# Patient Record
Sex: FEMALE | Race: WHITE | URBAN - METROPOLITAN AREA
[De-identification: names, ages, dates, MRNs, and addresses within clinical notes are randomized per-mention and may not be internally consistent; named-entity substitution may affect disease eponyms.]

---

## 2017-02-16 ENCOUNTER — OFFICE VISIT (OUTPATIENT)
Dept: FAMILY MEDICINE | Facility: CLINIC | Age: 47
End: 2017-02-16
Payer: COMMERCIAL

## 2017-02-16 VITALS
HEART RATE: 100 BPM | BODY MASS INDEX: 31.92 KG/M2 | SYSTOLIC BLOOD PRESSURE: 130 MMHG | DIASTOLIC BLOOD PRESSURE: 80 MMHG | WEIGHT: 200.8 LBS | TEMPERATURE: 97.9 F

## 2017-02-16 DIAGNOSIS — M53.3 SI (SACROILIAC) JOINT DYSFUNCTION: Primary | ICD-10-CM

## 2017-02-16 PROCEDURE — 20610 DRAIN/INJ JOINT/BURSA W/O US: CPT | Mod: LT | Performed by: PHYSICIAN ASSISTANT

## 2017-02-16 RX ORDER — DULOXETIN HYDROCHLORIDE 30 MG/1
30 CAPSULE, DELAYED RELEASE ORAL
COMMUNITY
Start: 2017-02-14

## 2017-02-16 RX ORDER — TRIAMCINOLONE ACETONIDE 40 MG/ML
40 INJECTION, SUSPENSION INTRA-ARTICULAR; INTRAMUSCULAR ONCE
Qty: 1 ML | Refills: 0 | COMMUNITY
Start: 2017-02-16

## 2017-02-16 ASSESSMENT — ENCOUNTER SYMPTOMS
FEVER: 0
EYE REDNESS: 0
DYSURIA: 0
WEIGHT LOSS: 0
NEUROLOGICAL NEGATIVE: 1
ORTHOPNEA: 0
SPUTUM PRODUCTION: 0
HEMOPTYSIS: 0
FREQUENCY: 0
HALLUCINATIONS: 0
ABDOMINAL PAIN: 0
SHORTNESS OF BREATH: 0
NAUSEA: 0
SORE THROAT: 0
LOSS OF CONSCIOUSNESS: 0
FOCAL WEAKNESS: 0
COUGH: 0
EYE DISCHARGE: 0
PALPITATIONS: 0
SENSORY CHANGE: 0
SEIZURES: 0
EYE PAIN: 0
HEARTBURN: 0
NECK PAIN: 0
DIAPHORESIS: 0
HEADACHES: 0
MYALGIAS: 1
DIZZINESS: 0
BLOOD IN STOOL: 0
DEPRESSION: 0
BLURRED VISION: 0
INSOMNIA: 0
WEAKNESS: 0
DIARRHEA: 0
CONSTIPATION: 0
PHOTOPHOBIA: 0
TINGLING: 0
WHEEZING: 0
NERVOUS/ANXIOUS: 0
DOUBLE VISION: 0
BACK PAIN: 1
VOMITING: 0

## 2017-02-16 ASSESSMENT — LIFESTYLE VARIABLES: SUBSTANCE_ABUSE: 0

## 2017-02-16 ASSESSMENT — PAIN SCALES - GENERAL: PAINLEVEL: SEVERE PAIN (6)

## 2017-02-16 NOTE — PROGRESS NOTES
HPI    SUBJECTIVE:                                                    Enriqueta Wilson is a 46 year old female who presents to clinic today for follow-up of her back pain/SI joint dysfunction. She is here to have another injection in her left SI joint. She has done well with injections in the past and has had no problems. She denies any numbness or tingling associated with this.      Back injections       Duration: Years     Description (location/character/radiation): Lower back both sides. Patient states that she is wanting one injection at the left SI joint     Intensity:  moderate    Accompanying signs and symptoms: none    History (similar episodes/previous evaluation): None    Precipitating or alleviating factors: None    Therapies tried and outcome: Cortisone     Problem list and histories reviewed & adjusted, as indicated.  Additional history: as documented    There is no problem list on file for this patient.    History reviewed. No pertinent past surgical history.    Social History   Substance Use Topics     Smoking status: Never Smoker     Smokeless tobacco: Not on file     Alcohol use Not on file     History reviewed. No pertinent family history.      Current Outpatient Prescriptions   Medication Sig Dispense Refill     DULoxetine (CYMBALTA) 30 MG EC capsule Take 30 mg by mouth       levothyroxine (SYNTHROID, LEVOTHROID) 75 MCG tablet Take 75 mcg by mouth       hydrochlorothiazide (HYDRODIURIL) 25 MG tablet Take 25 mg by mouth       cyclobenzaprine (FLEXERIL) 10 MG tablet Take 10 mg by mouth       cholecalciferol 2000 UNITS tablet Take 4,000 Units by mouth       montelukast (SINGULAIR) 10 MG tablet Take 10 mg by mouth       guaiFENesin (MUCINEX) 600 MG 12 hr tablet        Multiple Vitamin (MULTI-VITAMINS) TABS Take 1 tablet by mouth       HM OMEGA-3-6-9 FATTY ACIDS CAPS        Potassium Chloride ER 20 MEQ TBCR Take 20 mEq by mouth       cetirizine (ZYRTEC) 10 MG tablet        Allergies   Allergen  Reactions     Codeine Other (See Comments)     Comment: Unknown, Description:      Omeprazole Hives     Pregabalin Other (See Comments)     Amoxicillin Rash     Azithromycin Rash     Comm     Penicillins Rash     Problem list, Medication list, Allergies, and Medical/Social/Surgical histories reviewed in [facility] and updated as appropriate.          Review of Systems   Constitutional: Negative for diaphoresis, fever, malaise/fatigue and weight loss.   HENT: Negative for congestion, ear discharge, ear pain, hearing loss, nosebleeds and sore throat.    Eyes: Negative for blurred vision, double vision, photophobia, pain, discharge and redness.   Respiratory: Negative for cough, hemoptysis, sputum production, shortness of breath and wheezing.    Cardiovascular: Negative for chest pain, palpitations, orthopnea and leg swelling.   Gastrointestinal: Negative for abdominal pain, blood in stool, constipation, diarrhea, heartburn, melena, nausea and vomiting.   Genitourinary: Negative.  Negative for dysuria, frequency and urgency.   Musculoskeletal: Positive for back pain and myalgias. Negative for joint pain and neck pain.   Skin: Negative for itching and rash.   Neurological: Negative.  Negative for dizziness, tingling, sensory change, focal weakness, seizures, loss of consciousness, weakness and headaches.   Endo/Heme/Allergies: Negative.    Psychiatric/Behavioral: Negative for depression, hallucinations, substance abuse and suicidal ideas. The patient is not nervous/anxious and does not have insomnia.          Physical Exam   Constitutional: She is oriented to person, place, and time and well-developed, well-nourished, and in no distress. No distress.   HENT:   Head: Normocephalic and atraumatic.   Right Ear: External ear normal.   Left Ear: External ear normal.   Nose: Nose normal.   Eyes: Conjunctivae and EOM are normal. Pupils are equal, round, and reactive to light. Right eye exhibits no discharge. Left eye exhibits no  discharge. No scleral icterus.   Neck: Normal range of motion. Neck supple. No JVD present. No tracheal deviation present. No thyromegaly present.   Cardiovascular: Normal rate, regular rhythm, normal heart sounds and intact distal pulses.  Exam reveals no gallop and no friction rub.    No murmur heard.  Pulmonary/Chest: Effort normal and breath sounds normal. No stridor. No respiratory distress. She has no wheezes. She has no rales. She exhibits no tenderness.   Abdominal: Soft. Bowel sounds are normal. She exhibits no distension and no mass. There is no tenderness. There is no rebound and no guarding.   Musculoskeletal: She exhibits no edema.        Lumbar back: She exhibits decreased range of motion, tenderness, pain and spasm. Edema:  but I would.        Back:    Lymphadenopathy:     She has no cervical adenopathy.   Neurological: She is alert and oriented to person, place, and time. She has normal reflexes. No cranial nerve deficit. She exhibits normal muscle tone. Gait normal.   Skin: Skin is warm and dry. No rash noted. She is not diaphoretic. No erythema. No pallor.   Psychiatric: Mood, memory, affect and judgment normal.         (M53.3) SI (sacroiliac) joint dysfunction  (primary encounter diagnosis)  Comment:   Plan:    Procedure note: After prep with rubbing alcohol using 80 mg of triamcinolone combined with 7 cc of lidocaine the left SI joint was injected. This offered return immediately if she'll follow-up on a when necessary basis.

## 2017-02-16 NOTE — MR AVS SNAPSHOT
"              After Visit Summary   2017    Enriqueta Wilson    MRN: [id number]           Patient Information     Date Of Birth          1970        Visit Information        Provider Department      2017 2:00 PM Vincent Kingsley PA-C [facility]        Today's Diagnoses     SI (sacroiliac) joint dysfunction    -  1       Follow-ups after your visit        Follow-up notes from your care team     Return if symptoms worsen or fail to improve.      Who to contact     If you have questions or need follow up information about today's clinic visit or your schedule please contact [facility] directly at [phone].  Normal or non-critical lab and imaging results will be communicated to you by MyChart, letter or phone within 4 business days after the clinic has received the results. If you do not hear from us within 7 days, please contact the clinic through [employer] or phone. If you have a critical or abnormal lab result, we will notify you by phone as soon as possible.  Submit refill requests through [employer] or call your pharmacy and they will forward the refill request to us. Please allow 3 business days for your refill to be completed.          Additional Information About Your Visit        MyChart Information     [employer] lets you send messages to your doctor, view your test results, renew your prescriptions, schedule appointments and more. To sign up, go to [email or web address] . Click on \"Log in\" on the left side of the screen, which will take you to the Welcome page. Then click on \"Sign up Now\" on the right side of the page.     You will be asked to enter the access code listed below, as well as some personal information. Please follow the directions to create your username and password.     Your access code is: [email or web address]  Expires: 2017  2:34 PM     Your access code will  in 90 days. If you need help or a new code, please call your [facility] or " [phone].        Care EveryWhere ID     This is your Care EveryWhere ID. This could be used by other organizations to access your [facility] medical records  [id number]        Your Vitals Were     Pulse Temperature BMI (Body Mass Index)             100 97.9  F (36.6  C) (Tympanic) 31.92 kg/m2          Blood Pressure from Last 3 Encounters:   02/16/17 130/80   08/01/16 118/78   03/01/16 (!) 123/93    Weight from Last 3 Encounters:   02/16/17 200 lb 12.8 oz (91.1 kg)   08/01/16 190 lb 6.4 oz (86.4 kg)   03/01/16 192 lb (87.1 kg)              We Performed the Following     Drain/Inject Large Joint/Bursa        Primary Care Provider Office Phone # Fax #    Vincent Kingsley PA-C [phone]       [place] [phone]        Thank you!     Thank you for choosing [facility]  for your care. Our goal is always to provide you with excellent care. Hearing back from our patients is one way we can continue to improve our services. Please take a few minutes to complete the written survey that you may receive in the mail after your visit with us. Thank you!             Your Updated Medication List - Protect others around you: Learn how to safely use, store and throw away your medicines at www.disposemymeds.org.          This list is accurate as of: 2/16/17  2:34 PM.  Always use your most recent med list.                   Brand Name Dispense Instructions for use    cetirizine 10 MG tablet    zyrTEC         cholecalciferol 2000 UNITS tablet      Take 4,000 Units by mouth       cyclobenzaprine 10 MG tablet    FLEXERIL     Take 10 mg by mouth       DULoxetine 30 MG EC capsule    CYMBALTA     Take 30 mg by mouth       guaiFENesin 600 MG 12 hr tablet    MUCINEX         HM OMEGA-3-6-9 FATTY ACIDS Caps          hydrochlorothiazide 25 MG tablet    HYDRODIURIL     Take 25 mg by mouth       levothyroxine 75 MCG tablet    SYNTHROID/LEVOTHROID     Take 75 mcg by mouth        montelukast 10 MG tablet    SINGULAIR     Take 10 mg by mouth       MULTI-VITAMINS Tabs      Take 1 tablet by mouth       Potassium Chloride ER 20 MEQ Tbcr      Take 20 mEq by mouth

## 2017-04-24 ENCOUNTER — TELEPHONE (OUTPATIENT)
Dept: FAMILY MEDICINE | Facility: CLINIC | Age: 47
End: 2017-04-24

## 2017-04-24 NOTE — TELEPHONE ENCOUNTER
4/24/2017    Call Regarding Preventive Health Screening Cervical/PAP    Attempt 1    Message on voicemail     Comments:           Outreach   Ayah Santillan

## 2017-06-08 NOTE — TELEPHONE ENCOUNTER
6/8/2017    Call Regarding Preventive Health Screening Cervical/PAP    Attempt 2    Message on voicemail     Comments:       Outreach   cnt

## 2017-07-13 NOTE — TELEPHONE ENCOUNTER
7/13/2017    Call Regarding Preventive Health Screening Cervical/PAP    Attempt 3    Message on voicemail     Comments:       Outreach   CC

## 2017-12-04 ENCOUNTER — TELEPHONE (OUTPATIENT)
Dept: FAMILY MEDICINE | Facility: CLINIC | Age: 47
End: 2017-12-04

## 2017-12-04 NOTE — TELEPHONE ENCOUNTER
Reviewing charts. Patient is due for a physical with pap smear.    Called and left message for patient to call clinic back. When patient calls back please help her schedule a physical with pap smear.    Maria De Jesus Solis MA

## 2021-05-26 ENCOUNTER — RECORDS - HEALTHEAST (OUTPATIENT)
Dept: ADMINISTRATIVE | Facility: CLINIC | Age: 51
End: 2021-05-26

## 2021-05-28 ENCOUNTER — RECORDS - HEALTHEAST (OUTPATIENT)
Dept: ADMINISTRATIVE | Facility: CLINIC | Age: 51
End: 2021-05-28